# Patient Record
Sex: MALE | Race: WHITE | URBAN - METROPOLITAN AREA
[De-identification: names, ages, dates, MRNs, and addresses within clinical notes are randomized per-mention and may not be internally consistent; named-entity substitution may affect disease eponyms.]

---

## 2018-02-10 ENCOUNTER — HOSPITAL ENCOUNTER (EMERGENCY)
Facility: CLINIC | Age: 58
Discharge: HOME | End: 2018-02-10
Attending: EMERGENCY MEDICINE | Admitting: EMERGENCY MEDICINE
Payer: COMMERCIAL

## 2018-02-10 ENCOUNTER — APPOINTMENT (OUTPATIENT)
Dept: CT IMAGING | Facility: CLINIC | Age: 58
End: 2018-02-10
Attending: EMERGENCY MEDICINE
Payer: COMMERCIAL

## 2018-02-10 ENCOUNTER — APPOINTMENT (OUTPATIENT)
Dept: ULTRASOUND IMAGING | Facility: CLINIC | Age: 58
End: 2018-02-10
Attending: EMERGENCY MEDICINE
Payer: COMMERCIAL

## 2018-02-10 VITALS
HEART RATE: 76 BPM | RESPIRATION RATE: 18 BRPM | SYSTOLIC BLOOD PRESSURE: 118 MMHG | WEIGHT: 184 LBS | TEMPERATURE: 98.1 F | DIASTOLIC BLOOD PRESSURE: 85 MMHG | OXYGEN SATURATION: 98 %

## 2018-02-10 DIAGNOSIS — K85.00 IDIOPATHIC ACUTE PANCREATITIS WITHOUT INFECTION OR NECROSIS: ICD-10-CM

## 2018-02-10 DIAGNOSIS — R74.8 ELEVATED AMYLASE AND LIPASE: ICD-10-CM

## 2018-02-10 DIAGNOSIS — R10.11 RUQ ABDOMINAL PAIN: ICD-10-CM

## 2018-02-10 PROBLEM — B17.10 ACUTE HEPATITIS C VIRUS INFECTION WITHOUT HEPATIC COMA: Status: ACTIVE | Noted: 2018-02-10

## 2018-02-10 LAB
ALBUMIN SERPL-MCNC: 3.9 G/DL (ref 3.4–5)
ALBUMIN UR-MCNC: NEGATIVE MG/DL
ALP SERPL-CCNC: 45 U/L (ref 40–150)
ALT SERPL W P-5'-P-CCNC: 82 U/L (ref 0–70)
AMYLASE SERPL-CCNC: 397 U/L (ref 30–110)
ANION GAP SERPL CALCULATED.3IONS-SCNC: 10 MMOL/L (ref 3–14)
APPEARANCE UR: CLEAR
AST SERPL W P-5'-P-CCNC: 41 U/L (ref 0–45)
BASOPHILS # BLD AUTO: 0 10E9/L (ref 0–0.2)
BASOPHILS NFR BLD AUTO: 0.2 %
BILIRUB SERPL-MCNC: 0.6 MG/DL (ref 0.2–1.3)
BILIRUB UR QL STRIP: NEGATIVE
BUN SERPL-MCNC: 20 MG/DL (ref 7–30)
CALCIUM SERPL-MCNC: 9 MG/DL (ref 8.5–10.1)
CHLORIDE SERPL-SCNC: 104 MMOL/L (ref 94–109)
CO2 SERPL-SCNC: 23 MMOL/L (ref 20–32)
COLOR UR AUTO: YELLOW
CREAT SERPL-MCNC: 0.82 MG/DL (ref 0.66–1.25)
DIFFERENTIAL METHOD BLD: NORMAL
EOSINOPHIL # BLD AUTO: 0.3 10E9/L (ref 0–0.7)
EOSINOPHIL NFR BLD AUTO: 2.9 %
ERYTHROCYTE [DISTWIDTH] IN BLOOD BY AUTOMATED COUNT: 12.6 % (ref 10–15)
GFR SERPL CREATININE-BSD FRML MDRD: >90 ML/MIN/1.7M2
GLUCOSE SERPL-MCNC: 95 MG/DL (ref 70–99)
GLUCOSE UR STRIP-MCNC: NEGATIVE MG/DL
HCT VFR BLD AUTO: 41.5 % (ref 40–53)
HGB BLD-MCNC: 14.8 G/DL (ref 13.3–17.7)
HGB UR QL STRIP: NEGATIVE
IMM GRANULOCYTES # BLD: 0 10E9/L (ref 0–0.4)
IMM GRANULOCYTES NFR BLD: 0.2 %
KETONES UR STRIP-MCNC: NEGATIVE MG/DL
LACTATE BLD-SCNC: 1 MMOL/L (ref 0.7–2)
LACTATE BLD-SCNC: 3.4 MMOL/L (ref 0.7–2)
LEUKOCYTE ESTERASE UR QL STRIP: NEGATIVE
LIPASE SERPL-CCNC: 3292 U/L (ref 73–393)
LIPASE SERPL-CCNC: 4876 U/L (ref 73–393)
LYMPHOCYTES # BLD AUTO: 1.4 10E9/L (ref 0.8–5.3)
LYMPHOCYTES NFR BLD AUTO: 16.2 %
MCH RBC QN AUTO: 31.3 PG (ref 26.5–33)
MCHC RBC AUTO-ENTMCNC: 35.7 G/DL (ref 31.5–36.5)
MCV RBC AUTO: 88 FL (ref 78–100)
MONOCYTES # BLD AUTO: 0.8 10E9/L (ref 0–1.3)
MONOCYTES NFR BLD AUTO: 8.8 %
NEUTROPHILS # BLD AUTO: 6.2 10E9/L (ref 1.6–8.3)
NEUTROPHILS NFR BLD AUTO: 71.7 %
NITRATE UR QL: NEGATIVE
PH UR STRIP: 9 PH (ref 5–7)
PLATELET # BLD AUTO: 193 10E9/L (ref 150–450)
POTASSIUM SERPL-SCNC: 4 MMOL/L (ref 3.4–5.3)
PROT SERPL-MCNC: 7.6 G/DL (ref 6.8–8.8)
RBC # BLD AUTO: 4.73 10E12/L (ref 4.4–5.9)
SODIUM SERPL-SCNC: 137 MMOL/L (ref 133–144)
SOURCE: ABNORMAL
SP GR UR STRIP: 1 (ref 1–1.03)
UROBILINOGEN UR STRIP-MCNC: 0 MG/DL (ref 0–2)
WBC # BLD AUTO: 8.6 10E9/L (ref 4–11)

## 2018-02-10 PROCEDURE — 96375 TX/PRO/DX INJ NEW DRUG ADDON: CPT | Performed by: EMERGENCY MEDICINE

## 2018-02-10 PROCEDURE — 85025 COMPLETE CBC W/AUTO DIFF WBC: CPT | Performed by: EMERGENCY MEDICINE

## 2018-02-10 PROCEDURE — 81003 URINALYSIS AUTO W/O SCOPE: CPT | Performed by: EMERGENCY MEDICINE

## 2018-02-10 PROCEDURE — 96361 HYDRATE IV INFUSION ADD-ON: CPT | Performed by: EMERGENCY MEDICINE

## 2018-02-10 PROCEDURE — 25000128 H RX IP 250 OP 636: Performed by: EMERGENCY MEDICINE

## 2018-02-10 PROCEDURE — 99285 EMERGENCY DEPT VISIT HI MDM: CPT | Mod: Z6 | Performed by: EMERGENCY MEDICINE

## 2018-02-10 PROCEDURE — 96374 THER/PROPH/DIAG INJ IV PUSH: CPT | Mod: 59 | Performed by: EMERGENCY MEDICINE

## 2018-02-10 PROCEDURE — 74177 CT ABD & PELVIS W/CONTRAST: CPT

## 2018-02-10 PROCEDURE — 36415 COLL VENOUS BLD VENIPUNCTURE: CPT | Performed by: EMERGENCY MEDICINE

## 2018-02-10 PROCEDURE — 82150 ASSAY OF AMYLASE: CPT | Performed by: EMERGENCY MEDICINE

## 2018-02-10 PROCEDURE — 83690 ASSAY OF LIPASE: CPT | Performed by: EMERGENCY MEDICINE

## 2018-02-10 PROCEDURE — 83605 ASSAY OF LACTIC ACID: CPT | Performed by: EMERGENCY MEDICINE

## 2018-02-10 PROCEDURE — 87040 BLOOD CULTURE FOR BACTERIA: CPT | Mod: 91 | Performed by: EMERGENCY MEDICINE

## 2018-02-10 PROCEDURE — 80053 COMPREHEN METABOLIC PANEL: CPT | Performed by: EMERGENCY MEDICINE

## 2018-02-10 PROCEDURE — 99285 EMERGENCY DEPT VISIT HI MDM: CPT | Mod: 25 | Performed by: EMERGENCY MEDICINE

## 2018-02-10 PROCEDURE — 25000132 ZZH RX MED GY IP 250 OP 250 PS 637: Performed by: EMERGENCY MEDICINE

## 2018-02-10 PROCEDURE — 25000125 ZZHC RX 250: Performed by: EMERGENCY MEDICINE

## 2018-02-10 PROCEDURE — 76705 ECHO EXAM OF ABDOMEN: CPT

## 2018-02-10 RX ORDER — ONDANSETRON 2 MG/ML
4 INJECTION INTRAMUSCULAR; INTRAVENOUS
Status: COMPLETED | OUTPATIENT
Start: 2018-02-10 | End: 2018-02-10

## 2018-02-10 RX ORDER — HYDROCODONE BITARTRATE AND ACETAMINOPHEN 5; 325 MG/1; MG/1
1 TABLET ORAL EVERY 4 HOURS PRN
Qty: 10 TABLET | Refills: 0 | Status: SHIPPED | OUTPATIENT
Start: 2018-02-10

## 2018-02-10 RX ORDER — HYDROMORPHONE HYDROCHLORIDE 1 MG/ML
0.5 INJECTION, SOLUTION INTRAMUSCULAR; INTRAVENOUS; SUBCUTANEOUS EVERY 30 MIN PRN
Status: DISCONTINUED | OUTPATIENT
Start: 2018-02-10 | End: 2018-02-10 | Stop reason: HOSPADM

## 2018-02-10 RX ORDER — HYDROMORPHONE HYDROCHLORIDE 2 MG/1
2 TABLET ORAL EVERY 4 HOURS PRN
Qty: 8 TABLET | Refills: 0 | Status: SHIPPED | OUTPATIENT
Start: 2018-02-10 | End: 2018-02-10 | Stop reason: ALTCHOICE

## 2018-02-10 RX ORDER — IOPAMIDOL 755 MG/ML
90 INJECTION, SOLUTION INTRAVASCULAR ONCE
Status: COMPLETED | OUTPATIENT
Start: 2018-02-10 | End: 2018-02-10

## 2018-02-10 RX ORDER — ACETAMINOPHEN AND CODEINE PHOSPHATE 300; 30 MG/1; MG/1
1 TABLET ORAL 2 TIMES DAILY PRN
COMMUNITY

## 2018-02-10 RX ORDER — PENICILLIN V POTASSIUM 500 MG/1
500 TABLET, FILM COATED ORAL 4 TIMES DAILY
COMMUNITY

## 2018-02-10 RX ORDER — SODIUM CHLORIDE 9 MG/ML
1000 INJECTION, SOLUTION INTRAVENOUS CONTINUOUS
Status: DISCONTINUED | OUTPATIENT
Start: 2018-02-10 | End: 2018-02-10 | Stop reason: HOSPADM

## 2018-02-10 RX ADMIN — IOPAMIDOL 90 ML: 755 INJECTION, SOLUTION INTRAVENOUS at 13:16

## 2018-02-10 RX ADMIN — SODIUM CHLORIDE 1000 ML: 9 INJECTION, SOLUTION INTRAVENOUS at 13:07

## 2018-02-10 RX ADMIN — SODIUM CHLORIDE 62 ML: 9 INJECTION, SOLUTION INTRAVENOUS at 13:16

## 2018-02-10 RX ADMIN — ACETAMINOPHEN AND CODEINE PHOSPHATE 1 TABLET: 300; 30 TABLET ORAL at 15:32

## 2018-02-10 RX ADMIN — SODIUM CHLORIDE 1000 ML: 9 INJECTION, SOLUTION INTRAVENOUS at 11:54

## 2018-02-10 RX ADMIN — Medication 0.5 MG: at 11:30

## 2018-02-10 RX ADMIN — ONDANSETRON 4 MG: 2 INJECTION INTRAMUSCULAR; INTRAVENOUS at 11:30

## 2018-02-10 RX ADMIN — Medication 0.5 MG: at 11:56

## 2018-02-10 ASSESSMENT — ENCOUNTER SYMPTOMS
HEMATOLOGIC/LYMPHATIC NEGATIVE: 1
NEUROLOGICAL NEGATIVE: 1
RESPIRATORY NEGATIVE: 1
EYES NEGATIVE: 1
ENDOCRINE NEGATIVE: 1
ABDOMINAL PAIN: 1
MUSCULOSKELETAL NEGATIVE: 1
PSYCHIATRIC NEGATIVE: 1
ALLERGIC/IMMUNOLOGIC NEGATIVE: 1
CONSTITUTIONAL NEGATIVE: 1
CARDIOVASCULAR NEGATIVE: 1

## 2018-02-10 NOTE — ED AVS SNAPSHOT
[facility] Emergency Department    [address] [address]    Phone:  [phone]    Fax:  [phone]                                       Ramon Cali   MRN: [id number]    Department:  [facility] Emergency Department   Date of Visit:  2/10/2018           After Visit Summary Signature Page     I have received my discharge instructions, and my questions have been answered. I have discussed any challenges I see with this plan with the nurse or doctor.    ..........................................................................................................................................  Patient/Patient Representative Signature      ..........................................................................................................................................  Patient Representative Print Name and Relationship to Patient    ..................................................               ................................................  Date                                            Time    ..........................................................................................................................................  Reviewed by Signature/Title    ...................................................              ..............................................  Date                                                            Time

## 2018-02-10 NOTE — DISCHARGE INSTRUCTIONS
Discharge Instructions for Acute Pancreatitis  You have been diagnosed with acute pancreatitis. Your pancreas is inflamed or swollen. The pancreas is an organ that makes digestive juices and hormones. Gallstones are a common cause of pancreatitis. These hard stones form in the gallbladder. The gallbladder shares a tube with the pancreas into the small intestine. If gallstones block this tube, fluid can t leave the pancreas. The fluid backs up and causes redness and swelling (inflammation). There are other causes of pancreatitis. Make sure you understand the cause of your pancreatitis. Then you can try to stop it from happening again.  Immediate home care    Find someone to drive you to appointments. Acute pancreatitis is a serious condition, and you should never drive if you are experiencing symptoms.    Stop drinking if your illness was caused by alcohol.    Ask your healthcare provider about alcohol abuse programs and support groups such as Alcoholics Anonymous.    Ask your provider about prescription medicines that can help you stop drinking.    Tell your provider about the alcohol withdrawal symptoms you have when you stop drinking. This is very important. You may need close medical supervision and special medicines when you stop drinking. This will depend on your alcohol withdrawal history.     Take your medicines exactly as directed. Don t skip doses.    Eat a low-fat diet. Ask your provider for menus and other diet information.    Learn to take your own pulse. Keep a record of your results. Ask your provider which readings mean that you need medical attention.  Ongoing care    Tell your provider about any medicines you are taking. Some medicines can cause this condition.    Before starting any new medicine, ask your provider if it will harm your pancreas. This includes any new over-the-counter medicines, vitamins, or herbal supplements.      Tell your provider if you lose weight without dieting.    Be aware  of symptoms that may mean your pancreatitis has come back. These symptoms include belly pain, nausea and vomiting, and fever.    Keep all follow-up appointments with your provider. Problems can often show up later.  Follow-up  Follow up with your healthcare provider, or as advised.  When to call your provider  Call your healthcare provider right away if you have any of the following:    Fever of 100.4 F (38.0 C) or higher, or as advised by your provider    Severe pain from your upper belly to your back    Nausea and vomiting    Feely dizzy or lightheaded    Yellowing of your skin or eyes (jaundice)    Bruises on your belly or back    Belly swelling and tenderness    Rapid pulse    Shallow, fast breathing   Date Last Reviewed: 8/1/2016 2000-2017 [employer]. [address] [place] [address], [place] [address]. All rights reserved. This information is not intended as a substitute for professional medical care. Always follow your healthcare professional's instructions.

## 2018-02-10 NOTE — ED AVS SNAPSHOT
[facility] Emergency Department    [address] [address]    Phone:  [phone]    Fax:  [phone]                                       Ramon Cali   MRN: [id number]    Department:  [facility] Emergency Department   Date of Visit:  2/10/2018           Patient Information     Date Of Birth          1960        Your diagnoses for this visit were:     RUQ abdominal pain normal CT abdomen. Dilated gallbladder-(10.5cm in length) on RUQ ultrasound- NO stones and no evidence to suggest acute cholecystitis    Elevated amylase and lipase     Idiopathic acute pancreatitis without infection or necrosis        You were seen by Rodrigue Abbott MD.      Follow-up Information     Follow up with [facility] Emergency Department.    Specialty:  EMERGENCY MEDICINE    Why:  If you are unable to have a recheck of your lipase and amylase within 48 hours he need to return to the emergency department to have a recheck    Contact information:    [address] [place] [id number] [phone]    Additional information:    The medical center is located at   [address] [place] [address] (between [facility] and   [address], [place] miles north   of [place]).        Follow up with [facility] Emergency Department.    Specialty:  EMERGENCY MEDICINE    Why:  If you develop worsening pain despite oral pain medication provided a fever vomiting you need to return to the emergency department    Contact information:    [address] [place] [id number] [phone]    Additional information:    The medical center is located at   [address] [place] [address] (between [facility] and   [address], four miles north   [facility]).        Discharge Instructions         Discharge Instructions for Acute Pancreatitis  You have been diagnosed with acute pancreatitis. Your pancreas is inflamed or swollen. The pancreas is an organ that makes digestive juices and hormones.  Gallstones are a common cause of pancreatitis. These hard stones form in the gallbladder. The gallbladder shares a tube with the pancreas into the small intestine. If gallstones block this tube, fluid can t leave the pancreas. The fluid backs up and causes redness and swelling (inflammation). There are other causes of pancreatitis. Make sure you understand the cause of your pancreatitis. Then you can try to stop it from happening again.  Immediate home care    Find someone to drive you to appointments. Acute pancreatitis is a serious condition, and you should never drive if you are experiencing symptoms.    Stop drinking if your illness was caused by alcohol.    Ask your healthcare provider about alcohol abuse programs and support groups such as Alcoholics Anonymous.    Ask your provider about prescription medicines that can help you stop drinking.    Tell your provider about the alcohol withdrawal symptoms you have when you stop drinking. This is very important. You may need close medical supervision and special medicines when you stop drinking. This will depend on your alcohol withdrawal history.     Take your medicines exactly as directed. Don t skip doses.    Eat a low-fat diet. Ask your provider for menus and other diet information.    Learn to take your own pulse. Keep a record of your results. Ask your provider which readings mean that you need medical attention.  Ongoing care    Tell your provider about any medicines you are taking. Some medicines can cause this condition.    Before starting any new medicine, ask your provider if it will harm your pancreas. This includes any new over-the-counter medicines, vitamins, or herbal supplements.      Tell your provider if you lose weight without dieting.    Be aware of symptoms that may mean your pancreatitis has come back. These symptoms include belly pain, nausea and vomiting, and fever.    Keep all follow-up appointments with your provider. Problems can often  show up later.  Follow-up  Follow up with your healthcare provider, or as advised.  When to call your provider  Call your healthcare provider right away if you have any of the following:    Fever of 100.4 F (38.0 C) or higher, or as advised by your provider    Severe pain from your upper belly to your back    Nausea and vomiting    Feely dizzy or lightheaded    Yellowing of your skin or eyes (jaundice)    Bruises on your belly or back    Belly swelling and tenderness    Rapid pulse    Shallow, fast breathing   Date Last Reviewed: 8/1/2016 2000-2017 [employer]. [address] [place] [address]. All rights reserved. This information is not intended as a substitute for professional medical care. Always follow your healthcare professional's instructions.          Discharge References/Attachments     HYDROCODONE BITARTRATE, ACETAMINOPHEN ORAL TABLET (ENGLISH)      24 Hour Appointment Hotline       To make an appointment at any [facility] clinic, call [phone] ([phone]). If you don't have a family doctor or clinic, we will help you find one. [facility] clinics are conveniently located to serve the needs of you and your family.             Review of your medicines      START taking        Dose / Directions Last dose taken    HYDROcodone-acetaminophen 5-325 MG per tablet   Commonly known as:  NORCO   Dose:  1 tablet   Quantity:  10 tablet        Take 1 tablet by mouth every 4 hours as needed   Refills:  0          Our records show that you are taking the medicines listed below. If these are incorrect, please call your family doctor or clinic.        Dose / Directions Last dose taken    IBUPROFEN PO   Dose:  800 mg        Take 800 mg by mouth 2 times daily as needed for moderate pain   Refills:  0        penicillin V potassium 500 MG tablet   Commonly known as:  VEETID   Dose:  500 mg        Take 500 mg by mouth 4 times daily   Refills:  0        TYLENOL PO   Dose:  975 mg        Take 975  mg by mouth 2 times daily as needed for mild pain or fever   Refills:  0        TYLENOL WITH CODEINE #3 300-30 MG per tablet   Dose:  1 tablet   Generic drug:  acetaminophen-codeine        Take 1 tablet by mouth 2 times daily as needed for mild pain   Refills:  0                Prescriptions were sent or printed at these locations (1 Prescription)                   Other Prescriptions                Printed at Department/Unit printer (1 of 1)         HYDROcodone-acetaminophen (NORCO) 5-325 MG per tablet                Procedures and tests performed during your visit     Procedure/Test Number of Times Performed    Abdomen US, limited (RUQ only) 1    Amylase 1    Blood culture 2    CBC with platelets differential 1    CT Abdomen Pelvis w Contrast 1    Comprehensive metabolic panel 1    Lactic acid whole blood 2    Lipase 2    UA reflex to Microscopic 1      Orders Needing Specimen Collection     None      Pending Results     Date and Time Order Name Status Description    2/10/2018 1145 Blood culture Preliminary     2/10/2018 1145 Blood culture Preliminary             Pending Culture Results     Date and Time Order Name Status Description    2/10/2018 1145 Blood culture Preliminary     2/10/2018 1145 Blood culture Preliminary             Pending Results Instructions     If you had any lab results that were not finalized at the time of your Discharge, you can call the ED Lab Result RN at [phone]. You will be contacted by this team for any positive Lab results or changes in treatment. The nurses are available 7 days a week from 10A to 6:30P.  You can leave a message 24 hours per day and they will return your call.        Test Results From Your Hospital Stay        2/10/2018 11:46 AM      Component Results     Component Value Ref Range & Units Status    WBC 8.6 4.0 - 11.0 10e9/L Final    RBC Count 4.73 4.4 - 5.9 10e12/L Final    Hemoglobin 14.8 13.3 - 17.7 g/dL Final    Hematocrit 41.5 40.0 - 53.0 % Final    MCV 88  78 - 100 fl Final    MCH 31.3 26.5 - 33.0 pg Final    MCHC 35.7 31.5 - 36.5 g/dL Final    RDW 12.6 10.0 - 15.0 % Final    Platelet Count 193 150 - 450 10e9/L Final    Diff Method Automated Method  Final    % Neutrophils 71.7 % Final    % Lymphocytes 16.2 % Final    % Monocytes 8.8 % Final    % Eosinophils 2.9 % Final    % Basophils 0.2 % Final    % Immature Granulocytes 0.2 % Final    Absolute Neutrophil 6.2 1.6 - 8.3 10e9/L Final    Absolute Lymphocytes 1.4 0.8 - 5.3 10e9/L Final    Absolute Monocytes 0.8 0.0 - 1.3 10e9/L Final    Absolute Eosinophils 0.3 0.0 - 0.7 10e9/L Final    Absolute Basophils 0.0 0.0 - 0.2 10e9/L Final    Abs Immature Granulocytes 0.0 0 - 0.4 10e9/L Final         2/10/2018 11:53 AM      Component Results     Component Value Ref Range & Units Status    Sodium 137 133 - 144 mmol/L Final    Potassium 4.0 3.4 - 5.3 mmol/L Final    Chloride 104 94 - 109 mmol/L Final    Carbon Dioxide 23 20 - 32 mmol/L Final    Anion Gap 10 3 - 14 mmol/L Final    Glucose 95 70 - 99 mg/dL Final    Urea Nitrogen 20 7 - 30 mg/dL Final    Creatinine 0.82 0.66 - 1.25 mg/dL Final    GFR Estimate >90 >60 mL/min/1.7m2 Final    Non  GFR Calc    GFR Estimate If Black >90 >60 mL/min/1.7m2 Final    African American GFR Calc    Calcium 9.0 8.5 - 10.1 mg/dL Final    Bilirubin Total 0.6 0.2 - 1.3 mg/dL Final    Albumin 3.9 3.4 - 5.0 g/dL Final    Protein Total 7.6 6.8 - 8.8 g/dL Final    Alkaline Phosphatase 45 40 - 150 U/L Final    ALT 82 (H) 0 - 70 U/L Final    AST 41 0 - 45 U/L Final         2/10/2018 12:19 PM      Component Results     Component Value Ref Range & Units Status    Lipase 3292 (H) 73 - 393 U/L Final         2/10/2018 11:42 AM      Component Results     Component Value Ref Range & Units Status    Lactic Acid 3.4 (H) 0.7 - 2.0 mmol/L Final    Significant value called to and read back by  ALEAH RETANA RN ED 2/10/18 1142 MA           2/10/2018 12:58 PM      Component Results     Component Value Ref  Range & Units Status    Color Urine Yellow  Final    Appearance Urine Clear  Final    Glucose Urine Negative NEG^Negative mg/dL Final    Bilirubin Urine Negative NEG^Negative Final    Ketones Urine Negative NEG^Negative mg/dL Final    Specific Gravity Urine 1.005 1.003 - 1.035 Final    Blood Urine Negative NEG^Negative Final    pH Urine 9.0 (H) 5.0 - 7.0 pH Final    Protein Albumin Urine Negative NEG^Negative mg/dL Final    Urobilinogen mg/dL 0.0 0.0 - 2.0 mg/dL Final    Nitrite Urine Negative NEG^Negative Final    Leukocyte Esterase Urine Negative NEG^Negative Final    Source Midstream Urine  Final         2/10/2018 12:46 PM      Narrative     ULTRASOUND ABDOMEN LIMITED  2/10/2018 12:24 PM     HISTORY: Right upper quadrant and epigastric abdominal pain.   Vomiting.  Evaluate for gallstones versus common bile duct stone  versus other acute process. Right upper quadrant and epigastric  abdominal pain.  Vomiting.  Evaluate for gallstones versus common bile  duct stone versus other acute process.    COMPARISON: None.    FINDINGS:  Liver is normal in echogenicity without focal lesions.  Gallbladder demonstrates no cholelithiasis or wall thickening.  Minimally distended gallbladder at 10.5 cm in length. Extrahepatic  bile duct is normal in diameter. Pancreas is normal where visualized.  Right kidney is normal in size. There is no hydronephrosis.         Impression     IMPRESSION:  Minimally distended gallbladder of uncertain etiology.  Otherwise unremarkable right upper quadrant ultrasound.    POLLY SIU MD         2/10/2018  2:41 PM      Component Results     Component    Specimen Description    Blood Right Arm    Special Requests    Aerobic and anaerobic bottles received    Culture Micro    No growth after 2 hours         2/10/2018  2:41 PM      Component Results     Component    Specimen Description    Blood Left Arm    Special Requests    Aerobic and anaerobic bottles received    Culture Micro    No growth after 2  hours         2/10/2018  2:03 PM      Component Results     Component Value Ref Range & Units Status    Lactic Acid 1.0 0.7 - 2.0 mmol/L Final         2/10/2018  2:36 PM      Narrative     CT ABDOMEN AND PELVIS WITH CONTRAST 2/10/2018 1:29 PM     HISTORY: Epigastric abdominal pain and discomfort.  History of  hepatitis C.  Elevated lactate and lipase.  Distended gallbladder on  ultrasound but no stone.  Evaluate for acute pancreatitis versus other  acute process.     COMPARISON: None.    TECHNIQUE: Volumetric helical acquisition of CT images from the lung  bases through the symphysis pubis after the administration of 90 mL  Isovue 370 intravenous contrast. Radiation dose for this scan was  reduced using automated exposure control, adjustment of the mA and/or  kV according to patient size, or iterative reconstruction technique.    FINDINGS: The liver, bilateral kidneys and adrenal glands, pancreas,  and spleen demonstrate no worrisome focal lesion. No definite  peripancreatic inflammatory changes, mild pancreatitis can be occult  on CT. Moderate to large amount of stool. No diverticulitis. No  hydronephrosis. Normal appendix. Normal caliber aorta. There is no  free fluid in the abdomen or pelvis. No free air in the abdomen. Bone  windows reveal no destructive lesions. There are no abdominal or  pelvic lymph nodes that are abnormal by size criteria. The visualized  lung bases demonstrate a 5 mm nodule in the right lower lobe, and a 3  mm nodule in the left lower lobe, and are otherwise unremarkable.  There are no dilated loops of small bowel or colon.        Impression     IMPRESSION:   1. No acute process demonstrated within the abdomen and pelvis.   2. Bilateral lower lobe tiny nodules.    Recommendations for one or multiple incidental lung nodules < 6mm :    Low risk patients: No routine follow-up.    High risk patients: Optional follow-up CT at 12 months; if  unchanged, no further follow-up.    *Low Risk: Minimal  "or absent history of smoking or other known risk  factors.  *Nonsolid (ground glass) or partly solid nodules may require longer  follow-up to exclude indolent adenocarcinoma.  *Recommendations based on Guidelines for the Management of Incidental  Pulmonary Nodules Detected at CT: From the Fleischner Society 2017,  Radiology 2017.    POLLY SIU MD         2/10/2018  1:32 PM      Component Results     Component Value Ref Range & Units Status    Amylase 397 (H) 30 - 110 U/L Final         2/10/2018  3:56 PM      Component Results     Component Value Ref Range & Units Status    Lipase 4876 (H) 73 - 393 U/L Final    Critical Value called to and read back by  AMBER LARES AT [facility] 02/10/2018 YSTEVEN                  Thank you for choosing [facility]       Thank you for choosing [facility] for your care. Our goal is always to provide you with excellent care. Hearing back from our patients is one way we can continue to improve our services. Please take a few minutes to complete the written survey that you may receive in the mail after you visit with us. Thank you!        happn Information     happn lets you send messages to your doctor, view your test results, renew your prescriptions, schedule appointments and more. To sign up, go to [email or web address] . Click on \"Log in\" on the left side of the screen, which will take you to the Welcome page. Then click on \"Sign up Now\" on the right side of the page.     You will be asked to enter the access code listed below, as well as some personal information. Please follow the directions to create your username and password.     Your access code is: [email or web address]  Expires: 2018  4:30 PM     Your access code will  in 90 days. If you need help or a new code, please call your [facility] clinic or [phone].        Care EveryWhere ID     This is your Care EveryWhere ID. This could be used by other organizations to access your [facility] medical records  [id number]        Equal " Access to Services     TOAN [facility] Center[facility] : Gilberto Carrington, aldo horton, qatolu shipman. So [facility] [phone].    ATENCIÓN: Si habla español, tiene a rucker disposición servicios gratuitos de asistencia lingüística. Llame al [phone].    We comply with applicable federal civil rights laws and [place] laws. We do not discriminate on the basis of race, color, national origin, age, disability, sex, sexual orientation, or gender identity.            After Visit Summary       This is your record. Keep this with you and show to your community pharmacist(s) and doctor(s) at your next visit.

## 2018-02-10 NOTE — ED NOTES
Plan for discharge back with correction guards to [facility]. md gave report to medical staff.

## 2018-02-10 NOTE — ED PROVIDER NOTES
History     Chief Complaint   Patient presents with     Abdominal Pain     ruq abd pain sudden onset     HPI  Ramon Cali is a 57 year old male who has a history of acute hepatitis C virus infection without hepatic coma who presents to the ED for evaluation of abdominal pain. Patient reports that he had sudden onset of RUQ abdominal pain this morning with nausea and near vomiting. Patient notes some shortness of breath with onst of abdominal pain. He recently had two teeth pulled for which he has been taking tylenol -3, ibuprofen, and penicillin. He has not other daily medications. He denies back pain, and he has no history of abdominal surgeries.     Social History: Patient is an inmate of the [facility]. He is here in the ED with two correctional officers.     Problem List:    Patient Active Problem List    Diagnosis Date Noted     Acute hepatitis C virus infection without hepatic coma 02/10/2018     Priority: Medium        Past Medical History:    No past medical history on file.    Past Surgical History:    No past surgical history on file.    Family History:    No family history on file.    Social History:  Marital Status:    Social History   Substance Use Topics     Smoking status: Not on file     Smokeless tobacco: Not on file     Alcohol use Not on file        Medications:      penicillin V potassium (VEETID) 500 MG tablet   acetaminophen-codeine (TYLENOL WITH CODEINE #3) 300-30 MG per tablet   Acetaminophen (TYLENOL PO)   IBUPROFEN PO   HYDROcodone-acetaminophen (NORCO) 5-325 MG per tablet         Review of Systems   Constitutional: Negative.    HENT: Negative.    Eyes: Negative.    Respiratory: Negative.    Cardiovascular: Negative.    Gastrointestinal: Positive for abdominal pain.   Endocrine: Negative.    Genitourinary: Negative.    Musculoskeletal: Negative.    Skin: Negative.    Allergic/Immunologic: Negative.    Neurological: Negative.    Hematological: Negative.    Psychiatric/Behavioral:  Negative.    All other systems reviewed and are negative.      Physical Exam   BP: 111/89  Pulse: 76  Temp: 98.1  F (36.7  C)  Resp: 18  Weight: 83.5 kg (184 lb)  SpO2: 97 %      Physical Exam   Constitutional: He is oriented to person, place, and time. He appears well-developed and well-nourished. No distress.   HENT:   Head: Normocephalic and atraumatic.   Eyes: Conjunctivae and EOM are normal. Pupils are equal, round, and reactive to light.   Neck: Normal range of motion. Neck supple. No JVD present. No tracheal deviation present. No thyromegaly present.   Cardiovascular: Normal rate and regular rhythm.  Exam reveals no gallop and no friction rub.    No murmur heard.  Pulmonary/Chest: Effort normal. No stridor. No respiratory distress. He has no wheezes. He has no rales. He exhibits no tenderness.   Abdominal: Soft. There is tenderness in the right upper quadrant and epigastric area. There is rebound and guarding.       Lymphadenopathy:     He has no cervical adenopathy.   Neurological: He is oriented to person, place, and time.   Skin: No rash noted. He is not diaphoretic. No erythema. No pallor.   Psychiatric: He has a normal mood and affect. His behavior is normal. Judgment normal.       ED Course     ED Course     Procedures               Critical Care time:  none               Lactate is greater than 2 due to pain, elevated lipase, at this time there is no sign of severe sepsis or septic shock.        ED Medications:  Medications   HYDROmorphone (PF) (DILAUDID) injection 0.5 mg (0.5 mg Intravenous Given 2/10/18 1156)   0.9% sodium chloride infusion (0 mLs Intravenous Stopped 2/10/18 1630)   ondansetron (ZOFRAN) injection 4 mg (4 mg Intravenous Given 2/10/18 1130)   0.9% sodium chloride BOLUS (0 mLs Intravenous Stopped 2/10/18 1307)   sodium chloride 0.9 % bag 500mL for CT scan flush use (62 mLs Intravenous Given 2/10/18 1316)   iopamidol (ISOVUE-370) solution 90 mL (90 mLs Intravenous Given 2/10/18 1316)    acetaminophen-codeine (TYLENOL #3) 300-30 MG per tablet 1 tablet (1 tablet Oral Given 2/10/18 1532)       ED Vitals:  Vitals:    02/10/18 1300 02/10/18 1303 02/10/18 1308 02/10/18 1310   BP:   118/85    Pulse:       Resp:       Temp:       SpO2: 98% 96% 99% 98%   Weight:           ED Labs and Imaging:  Results for orders placed or performed during the hospital encounter of 02/10/18 (from the past 24 hour(s))   CBC with platelets differential   Result Value Ref Range    WBC 8.6 4.0 - 11.0 10e9/L    RBC Count 4.73 4.4 - 5.9 10e12/L    Hemoglobin 14.8 13.3 - 17.7 g/dL    Hematocrit 41.5 40.0 - 53.0 %    MCV 88 78 - 100 fl    MCH 31.3 26.5 - 33.0 pg    MCHC 35.7 31.5 - 36.5 g/dL    RDW 12.6 10.0 - 15.0 %    Platelet Count 193 150 - 450 10e9/L    Diff Method Automated Method     % Neutrophils 71.7 %    % Lymphocytes 16.2 %    % Monocytes 8.8 %    % Eosinophils 2.9 %    % Basophils 0.2 %    % Immature Granulocytes 0.2 %    Absolute Neutrophil 6.2 1.6 - 8.3 10e9/L    Absolute Lymphocytes 1.4 0.8 - 5.3 10e9/L    Absolute Monocytes 0.8 0.0 - 1.3 10e9/L    Absolute Eosinophils 0.3 0.0 - 0.7 10e9/L    Absolute Basophils 0.0 0.0 - 0.2 10e9/L    Abs Immature Granulocytes 0.0 0 - 0.4 10e9/L   Comprehensive metabolic panel   Result Value Ref Range    Sodium 137 133 - 144 mmol/L    Potassium 4.0 3.4 - 5.3 mmol/L    Chloride 104 94 - 109 mmol/L    Carbon Dioxide 23 20 - 32 mmol/L    Anion Gap 10 3 - 14 mmol/L    Glucose 95 70 - 99 mg/dL    Urea Nitrogen 20 7 - 30 mg/dL    Creatinine 0.82 0.66 - 1.25 mg/dL    GFR Estimate >90 >60 mL/min/1.7m2    GFR Estimate If Black >90 >60 mL/min/1.7m2    Calcium 9.0 8.5 - 10.1 mg/dL    Bilirubin Total 0.6 0.2 - 1.3 mg/dL    Albumin 3.9 3.4 - 5.0 g/dL    Protein Total 7.6 6.8 - 8.8 g/dL    Alkaline Phosphatase 45 40 - 150 U/L    ALT 82 (H) 0 - 70 U/L    AST 41 0 - 45 U/L   Lipase   Result Value Ref Range    Lipase 3292 (H) 73 - 393 U/L   Lactic acid whole blood   Result Value Ref Range    Lactic  Acid 3.4 (H) 0.7 - 2.0 mmol/L   Amylase   Result Value Ref Range    Amylase 397 (H) 30 - 110 U/L   Blood culture   Result Value Ref Range    Specimen Description Blood Right Arm     Special Requests Aerobic and anaerobic bottles received     Culture Micro No growth after 2 hours    Blood culture   Result Value Ref Range    Specimen Description Blood Left Arm     Special Requests Aerobic and anaerobic bottles received     Culture Micro No growth after 2 hours    UA reflex to Microscopic   Result Value Ref Range    Color Urine Yellow     Appearance Urine Clear     Glucose Urine Negative NEG^Negative mg/dL    Bilirubin Urine Negative NEG^Negative    Ketones Urine Negative NEG^Negative mg/dL    Specific Gravity Urine 1.005 1.003 - 1.035    Blood Urine Negative NEG^Negative    pH Urine 9.0 (H) 5.0 - 7.0 pH    Protein Albumin Urine Negative NEG^Negative mg/dL    Urobilinogen mg/dL 0.0 0.0 - 2.0 mg/dL    Nitrite Urine Negative NEG^Negative    Leukocyte Esterase Urine Negative NEG^Negative    Source Midstream Urine    Abdomen US, limited (RUQ only)    Narrative    ULTRASOUND ABDOMEN LIMITED  2/10/2018 12:24 PM     HISTORY: Right upper quadrant and epigastric abdominal pain.   Vomiting.  Evaluate for gallstones versus common bile duct stone  versus other acute process. Right upper quadrant and epigastric  abdominal pain.  Vomiting.  Evaluate for gallstones versus common bile  duct stone versus other acute process.    COMPARISON: None.    FINDINGS:  Liver is normal in echogenicity without focal lesions.  Gallbladder demonstrates no cholelithiasis or wall thickening.  Minimally distended gallbladder at 10.5 cm in length. Extrahepatic  bile duct is normal in diameter. Pancreas is normal where visualized.  Right kidney is normal in size. There is no hydronephrosis.       Impression    IMPRESSION:  Minimally distended gallbladder of uncertain etiology.  Otherwise unremarkable right upper quadrant ultrasound.    POLLY SIU MD    CT Abdomen Pelvis w Contrast    Narrative    CT ABDOMEN AND PELVIS WITH CONTRAST 2/10/2018 1:29 PM     HISTORY: Epigastric abdominal pain and discomfort.  History of  hepatitis C.  Elevated lactate and lipase.  Distended gallbladder on  ultrasound but no stone.  Evaluate for acute pancreatitis versus other  acute process.     COMPARISON: None.    TECHNIQUE: Volumetric helical acquisition of CT images from the lung  bases through the symphysis pubis after the administration of 90 mL  Isovue 370 intravenous contrast. Radiation dose for this scan was  reduced using automated exposure control, adjustment of the mA and/or  kV according to patient size, or iterative reconstruction technique.    FINDINGS: The liver, bilateral kidneys and adrenal glands, pancreas,  and spleen demonstrate no worrisome focal lesion. No definite  peripancreatic inflammatory changes, mild pancreatitis can be occult  on CT. Moderate to large amount of stool. No diverticulitis. No  hydronephrosis. Normal appendix. Normal caliber aorta. There is no  free fluid in the abdomen or pelvis. No free air in the abdomen. Bone  windows reveal no destructive lesions. There are no abdominal or  pelvic lymph nodes that are abnormal by size criteria. The visualized  lung bases demonstrate a 5 mm nodule in the right lower lobe, and a 3  mm nodule in the left lower lobe, and are otherwise unremarkable.  There are no dilated loops of small bowel or colon.      Impression    IMPRESSION:   1. No acute process demonstrated within the abdomen and pelvis.   2. Bilateral lower lobe tiny nodules.    Recommendations for one or multiple incidental lung nodules < 6mm :    Low risk patients: No routine follow-up.    High risk patients: Optional follow-up CT at 12 months; if  unchanged, no further follow-up.    *Low Risk: Minimal or absent history of smoking or other known risk  factors.  *Nonsolid (ground glass) or partly solid nodules may require longer  follow-up to  exclude indolent adenocarcinoma.  *Recommendations based on Guidelines for the Management of Incidental  Pulmonary Nodules Detected at CT: From the Fleischner Society 2017,  Radiology 2017.    POLLY SIU MD   Lactic acid whole blood   Result Value Ref Range    Lactic Acid 1.0 0.7 - 2.0 mmol/L   Lipase   Result Value Ref Range    Lipase 4876 (H) 73 - 393 U/L       11:04 AM Patient assessed.    Assessments & Plan (with Medical Decision Making)   Clinical Impression: 57-year-old male who presented from the [place] who presented for evaluation for sudden onset of epigastric and right upper quadrant abdominal pain and discomfort.  Symptoms may be due to acute pancreatitis with an elevated lipase and amylase and dilated gallbladder without any stones or evidence for acute cholecystitis.  Symptoms began earlier in the morning a few hours prior to ED arrival.  Patient reports no prior history of abdominal surgeries.  He reports he is on Tylenol, ibuprofen and penicillin for recent dental extraction a weeks ago.  He reports a history of hepatitis C from IV drug use.  On my exam he has localized tenderness in the right upper quadrant with voluntary guarding but no rebound.  His abdomen is soft non-distended.  He has no back pain or flank pain and he is otherwise hemodynamically normal.      ED Course and Plan:   He was offered IV Dilaudid for pain control.  Because of his location of pain labs were obtained as well as an upper ultrasound to evaluate for stone versus acute cholecystitis versus other acute process.  Blood work was obtained.  His arrival lactate is 3.4 (repeat at 2 hours after 1L of IV fluids is 1.0).  Blood cultures were requested.  He has a normal urine today.  His lipase is 3292 (repeat- 4876), amylase is 397.  His ALT is 82.  AST is 41, normal alk phos, white count is 8.6.  His ultrasound shows a distended gallbladder of unclear cause however there are no stones or wall thickening appreciated.   Gallbladder was measured at 10.5 cm in length.  There is no extrahepatic bile duct dilatation and the liver was normal in echogenicity without any focal lesions. See interpreting radiologist report above.  CT of the abdomen and pelvis today does not show any evidence of acute pancreatitis.  There is no acute process demonstrated in the abdomen.  Mild pancreatitis can be occult on CT per radiology.  Please see the interpreting radiologist report above.  There is mention of moderate amount of colonic stool.  With his elevated lipase and amylase with a normal CT I elected to repeat his lipase and amylase.  His pain seemed to be improved after pain medication in the emergency department.  He requested Tylenol #3 for tooth pain.We discussed his abnormal lipase and amylase .    Because his pain is well controlled and his CT does not show any acute process-specifically no peripancreatic inflammation he is discharged home with plan to have a recheck of his lipase and amylase within 48 hours to ensure that it is trending downward.  He is a prisoner and I am told labs can be checked at the prison.  He was given oral norco to use for pain control as needed.  If he develops vomiting, fever, pain worsens he is to return to the emergency department.  If labs cannot be rechecked within 48 hours he is to return to the emergency department for recheck of his lipase and amylase to ensure that it is trending downward. I spoke with Annabel guzman (at [phone])- on-call nurse for the prison and relayed plan of care and rationale for discharge.  Plan is for the patient to be transferred to [place] where labs can be obtained in the morning by report from nursing at the prison.          Disclaimer: This note consists of symbols derived from keyboarding, dictation and/or voice recognition software. As a result, there may be errors in the script that have gone undetected. Please consider this when interpreting information found in  this chart.      I have reviewed the nursing notes.    I have reviewed the findings, diagnosis, plan and need for follow up with the patient.       New Prescriptions    HYDROCODONE-ACETAMINOPHEN (NORCO) 5-325 MG PER TABLET    Take 1 tablet by mouth every 4 hours as needed       Final diagnoses:   RUQ abdominal pain - normal CT abdomen. Dilated gallbladder-(10.5cm in length) on RUQ ultrasound- NO stones and no evidence to suggest acute cholecystitis   Elevated amylase and lipase   Idiopathic acute pancreatitis without infection or necrosis     This document serves as a record of the services and decisions personally performed and made by Rodrigue Abbott, *. The HPI was created on his behalf by   Gisela Mark, a trained medical scribe. The creation of this document is based the provider's statements to the medical scribe.  Gisela Mark 11:03 AM 2/10/2018    Provider:   The information in this document, created by the medical scribe for me, accurately reflects the services I personally performed and the decisions made by me. I have reviewed and approved this document for accuracy prior to leaving the patient care area.  Rodrigue Abbott, [place] 11:03 AM 2/10/2018    2/10/2018   [facility] EMERGENCY DEPARTMENT     Rodrigue Abbott MD  02/10/18 1638       Rodrigue Abbott MD  02/10/18 0866

## 2018-02-11 ENCOUNTER — RECORDS - HEALTHEAST (OUTPATIENT)
Dept: LAB | Facility: CLINIC | Age: 58
End: 2018-02-11

## 2018-02-11 LAB
AMYLASE SERPL-CCNC: 255 U/L (ref 5–120)
LIPASE SERPL-CCNC: 70 U/L (ref 0–52)

## 2018-02-12 ENCOUNTER — TELEPHONE (OUTPATIENT)
Dept: EMERGENCY MEDICINE | Facility: CLINIC | Age: 58
End: 2018-02-12

## 2018-02-12 NOTE — TELEPHONE ENCOUNTER
[facility] Emergency Department Lab result notification     Patient/parent Name  Martin Vázquez RN with [facility]    Reason for call  Request for Blood cultures, requested when finalized they be faxed to facility at [phone].  Questions can be directed to Martin at [phone]    Lab Result  Preliminary Blood culture is showing NO GROWTH.      Gracie Ziegler, RN  [employer] [facility] RN  Lung Nodule and ED Lab Result F/u RN  Epic pool (ED late result f/u RN): [id number]  [facility] INCIDENTAL RADIOLOGY F/U NURSES: [id number]  # [phone]

## 2018-02-16 LAB
BACTERIA SPEC CULT: NO GROWTH
BACTERIA SPEC CULT: NO GROWTH
Lab: NORMAL
Lab: NORMAL
SPECIMEN SOURCE: NORMAL
SPECIMEN SOURCE: NORMAL

## 2019-01-16 ENCOUNTER — RECORDS - HEALTHEAST (OUTPATIENT)
Dept: ADMINISTRATIVE | Facility: OTHER | Age: 59
End: 2019-01-16